# Patient Record
(demographics unavailable — no encounter records)

---

## 2024-12-04 NOTE — PHYSICAL EXAM
[Normal Coordination] : normal coordination [Normal Sensation] : normal sensation [Normal Mood and Affect] : normal mood and affect [Oriented] : oriented [Able to Communicate] : able to communicate [Well Developed] : well developed [Well Nourished] : well nourished [4___] : hamstring 4[unfilled]/5 [Right] : right knee [Lateral] : lateral [[place]] : skyline [Mild patellofemoral OA] : Mild patellofemoral OA [AP] : anteroposterior [Moderate tricompartmental OA medial narrowing] : Moderate tricompartmental OA medial narrowing [] : no extensor lag [FreeTextEntry9] : slightly progressed since 2020 films [TWNoteComboBox7] : flexion 130 degrees

## 2024-12-04 NOTE — DISCUSSION/SUMMARY
[de-identified] : We discussed formal PT, a home exercise program, ice therapy and the role of NSAIDS for the pain. These modalities will improve the patient's functionality in their activities of daily life.  Risks, benefits and contraindications were discussed.  The patient will follow up in 6 weeks or sooner as needed.

## 2024-12-04 NOTE — HISTORY OF PRESENT ILLNESS
[de-identified] : Patient is almost 2 months s/p visco injection, reports lateral pinching in the knee with all ambulation

## 2024-12-16 NOTE — DISCUSSION/SUMMARY
[de-identified] : We discussed MRI to eval for lateral meniscus tear, formal PT, a home exercise program, ice therapy and the role of NSAIDS for the pain. These modalities will improve the patient's functionality in their activities of daily life.  Risks, benefits and contraindications were discussed.  The patient will follow up in 6 weeks or sooner as needed.

## 2024-12-16 NOTE — HISTORY OF PRESENT ILLNESS
[de-identified] : Patient reports no improvement at all with the previous injection. He is c/o lateral knee pain

## 2024-12-16 NOTE — PHYSICAL EXAM
[Normal Coordination] : normal coordination [Normal Sensation] : normal sensation [Normal Mood and Affect] : normal mood and affect [Oriented] : oriented [Able to Communicate] : able to communicate [Well Developed] : well developed [Well Nourished] : well nourished [4___] : hamstring 4[unfilled]/5 [Right] : right knee [Lateral] : lateral [[place]] : skyline [Mild patellofemoral OA] : Mild patellofemoral OA [AP] : anteroposterior [Moderate tricompartmental OA medial narrowing] : Moderate tricompartmental OA medial narrowing [] : no extensor lag [FreeTextEntry9] : slightly progressed since 2020 films [TWNoteComboBox7] : flexion 130 degrees

## 2024-12-19 NOTE — PHYSICAL EXAM
[Normal Coordination] : normal coordination [Normal Sensation] : normal sensation [Normal Mood and Affect] : normal mood and affect [Oriented] : oriented [Able to Communicate] : able to communicate [Well Developed] : well developed [Well Nourished] : well nourished [4___] : hamstring 4[unfilled]/5 [Right] : right knee [Lateral] : lateral [[place]] : skyline [Mild patellofemoral OA] : Mild patellofemoral OA [AP] : anteroposterior [Moderate tricompartmental OA medial narrowing] : Moderate tricompartmental OA medial narrowing [] : no extensor lag [FreeTextEntry9] : slightly progressed since 2020 films [TWNoteComboBox7] : flexion 130 degrees Detail Level: Detailed Initiate Treatment: Clindamycin, Benzoyl Peroxide

## 2024-12-19 NOTE — DATA REVIEWED
[MRI] : MRI [Right] : of the right [Knee] : knee [Report was reviewed and noted in the chart] : The report was reviewed and noted in the chart [I reviewed the films/CD] : I reviewed the films/CD [FreeTextEntry1] : Tricompartmental OA, MCL sprain

## 2024-12-19 NOTE — DISCUSSION/SUMMARY
[de-identified] : We discussed continued formal PT, a home exercise program, ice therapy and the role of NSAIDS for the pain. These modalities will improve the patient's functionality in their activities of daily life.  Risks, benefits and contraindications were discussed.  The patient will follow up in 6 weeks or sooner as needed.

## 2025-02-20 NOTE — DISCUSSION/SUMMARY
[Surgical risks reviewed] : Surgical risks reviewed [de-identified] : The risks of the procedure, including but not limited to infection, bleeding, anesthetic complication, neurovascular injury and the possibility of subsequent surgery were discussed; the benefits, non-operative alternatives and expectations of the proposed procedure were also discussed. Post-operative management, the procedure itself and the expected recovery period were discussed at length with the patient. The need for post-operative physical therapy and home exercise regimen, possible bracing and medication management were also discussed. Informed consent was obtained.

## 2025-02-20 NOTE — HISTORY OF PRESENT ILLNESS
[de-identified] : Patient reports no improvement with prior treatments. He notes pain with walking. 
Spine appears normal, range of motion is not limited, no muscle or joint tenderness

## 2025-02-20 NOTE — PHYSICAL EXAM
[Normal Coordination] : normal coordination [Normal Sensation] : normal sensation [Normal Mood and Affect] : normal mood and affect [Oriented] : oriented [Able to Communicate] : able to communicate [Well Developed] : well developed [Well Nourished] : well nourished [4___] : hamstring 4[unfilled]/5 [Right] : right knee [Lateral] : lateral [[place]] : skyline [Mild patellofemoral OA] : Mild patellofemoral OA [AP] : anteroposterior [Moderate tricompartmental OA medial narrowing] : Moderate tricompartmental OA medial narrowing [] : no extensor lag [FreeTextEntry9] : slightly progressed since 2020 films [TWNoteComboBox7] : flexion 130 degrees

## 2025-04-24 NOTE — PHYSICAL EXAM
[Normal Coordination] : normal coordination [Normal Sensation] : normal sensation [Normal Mood and Affect] : normal mood and affect [Oriented] : oriented [Able to Communicate] : able to communicate [Well Developed] : well developed [Well Nourished] : well nourished [4___] : hamstring 4[unfilled]/5 [Right] : right knee [Lateral] : lateral [[place]] : skyline [Mild patellofemoral OA] : Mild patellofemoral OA [AP] : anteroposterior [Moderate tricompartmental OA medial narrowing] : Moderate tricompartmental OA medial narrowing [] : no extensor lag [FreeTextEntry9] : slightly progressed since 2020 films [TWNoteComboBox7] : flexion 130 degrees

## 2025-04-24 NOTE — DISCUSSION/SUMMARY
[Medication Risks Reviewed] : Medication risks reviewed [de-identified] : We discussed formal PT, a home exercise program, ice therapy and the role of NSAIDS (patient cannot take) for the pain. These modalities will improve the patient's functionality in their activities of daily life.  Risks, benefits and contraindications were discussed.  The patient will follow up in 6 weeks or sooner as needed.

## 2025-06-09 NOTE — PHYSICAL EXAM
[Normal Coordination] : normal coordination [Normal Sensation] : normal sensation [Normal Mood and Affect] : normal mood and affect [Oriented] : oriented [Able to Communicate] : able to communicate [Well Developed] : well developed [Well Nourished] : well nourished [4___] : hamstring 4[unfilled]/5 [Right] : right knee [Lateral] : lateral [[place]] : skyline [Mild patellofemoral OA] : Mild patellofemoral OA [AP] : anteroposterior [Moderate tricompartmental OA medial narrowing] : Moderate tricompartmental OA medial narrowing [] : patella maltracking [FreeTextEntry9] : slightly progressed since 2020 films [TWNoteComboBox7] : flexion 130 degrees

## 2025-07-14 NOTE — DISCUSSION/SUMMARY
[Medication Risks Reviewed] : Medication risks reviewed [de-identified] : We discussed continued home exercise program, ice therapy and the role of NSAIDS for the pain. These modalities will improve the patient's functionality in their activities of daily life.  Risks, benefits and contraindications were discussed.  The patient will follow up in 6 weeks or sooner as needed.

## 2025-07-14 NOTE — DISCUSSION/SUMMARY
[Medication Risks Reviewed] : Medication risks reviewed [de-identified] : We discussed continued home exercise program, ice therapy and the role of NSAIDS for the pain. These modalities will improve the patient's functionality in their activities of daily life.  Risks, benefits and contraindications were discussed.  The patient will follow up in 6 weeks or sooner as needed.

## 2025-07-14 NOTE — PHYSICAL EXAM
[Normal Coordination] : normal coordination [Normal Sensation] : normal sensation [Normal Mood and Affect] : normal mood and affect [Oriented] : oriented [Able to Communicate] : able to communicate [Well Developed] : well developed [Well Nourished] : well nourished [4___] : hamstring 4[unfilled]/5 [Right] : right knee [Lateral] : lateral [[place]] : skyline [Mild patellofemoral OA] : Mild patellofemoral OA [AP] : anteroposterior [Moderate tricompartmental OA medial narrowing] : Moderate tricompartmental OA medial narrowing [] : no extensor lag [FreeTextEntry9] : slightly progressed since 2020 films [TWNoteComboBox7] : flexion 130 degrees

## 2025-07-14 NOTE — PHYSICAL EXAM
[Normal Coordination] : normal coordination [Normal Sensation] : normal sensation [Normal Mood and Affect] : normal mood and affect [Oriented] : oriented [Able to Communicate] : able to communicate [Well Developed] : well developed [Well Nourished] : well nourished [4___] : hamstring 4[unfilled]/5 [Right] : right knee [Lateral] : lateral [[place]] : skyline [Mild patellofemoral OA] : Mild patellofemoral OA [AP] : anteroposterior [Moderate tricompartmental OA medial narrowing] : Moderate tricompartmental OA medial narrowing [] : no extensor lag [FreeTextEntry9] : slightly progressed since 2020 films [TWNoteComboBox7] : flexion 130 degrees

## 2025-07-24 NOTE — PROCEDURE
[Large Joint Injection] : Large joint injection [Right] : of the right [Knee] : knee [Pain] : pain [Inflammation] : inflammation [X-ray evidence of Osteoarthritis on this or prior visit] : x-ray evidence of Osteoarthritis on this or prior visit [Alcohol] : alcohol [Betadine] : betadine [Durolane (60mg)] : 60mg of Durolane [] : Patient tolerated procedure well [Call if redness, pain or fever occur] : call if redness, pain or fever occur [Apply ice for 15min out of every hour for the next 12-24 hours as tolerated] : apply ice for 15 minutes out of every hour for the next 12-24 hours as tolerated [Patient was advised to rest the joint(s) for ____ days] : patient was advised to rest the joint(s) for [unfilled] days [Previous OTC use and PT nontherapeutic] : patient has tried OTC's including aspirin, Ibuprofen, Aleve, etc or prescription NSAIDS, and/or exercises at home and/or physical therapy without satisfactory response [Patient had decreased mobility in the joint] : patient had decreased mobility in the joint [Risks, benefits, alternatives discussed / Verbal consent obtained] : the risks benefits, and alternatives have been discussed, and verbal consent was obtained [Altered anatomic landmarks d/t erosive arthritis] : altered anatomic landmarks d/t erosive arthritis [All ultrasound images have been permanently captured and stored accordingly in our picture archiving and communication system] : All ultrasound images have been permanently captured and stored accordingly in our picture archiving and communication system [Visualization of the needle and placement of injection was performed without complication] : visualization of the needle and placement of injection was performed without complication